# Patient Record
Sex: MALE | Race: WHITE | HISPANIC OR LATINO | Employment: FULL TIME | URBAN - METROPOLITAN AREA
[De-identification: names, ages, dates, MRNs, and addresses within clinical notes are randomized per-mention and may not be internally consistent; named-entity substitution may affect disease eponyms.]

---

## 2023-08-15 ENCOUNTER — APPOINTMENT (OUTPATIENT)
Dept: GENERAL RADIOLOGY | Facility: HOSPITAL | Age: 28
End: 2023-08-15

## 2023-08-15 ENCOUNTER — HOSPITAL ENCOUNTER (OUTPATIENT)
Facility: HOSPITAL | Age: 28
Discharge: HOME | End: 2023-08-15
Attending: EMERGENCY MEDICINE | Admitting: EMERGENCY MEDICINE
Payer: MEDICAID

## 2023-08-15 VITALS
BODY MASS INDEX: 36.45 KG/M2 | RESPIRATION RATE: 15 BRPM | HEART RATE: 89 BPM | DIASTOLIC BLOOD PRESSURE: 96 MMHG | SYSTOLIC BLOOD PRESSURE: 176 MMHG | TEMPERATURE: 97.9 F | OXYGEN SATURATION: 97 % | WEIGHT: 315 LBS | HEIGHT: 78 IN

## 2023-08-15 DIAGNOSIS — S92.522A CLOSED DISPLACED FRACTURE OF MIDDLE PHALANX OF LESSER TOE OF LEFT FOOT, INITIAL ENCOUNTER: Primary | ICD-10-CM

## 2023-08-15 DIAGNOSIS — S99.922A TOE INJURY, LEFT, INITIAL ENCOUNTER: ICD-10-CM

## 2023-08-15 PROCEDURE — 73660 X-RAY EXAM OF TOE(S): CPT

## 2023-08-15 PROCEDURE — G0463 HOSPITAL OUTPT CLINIC VISIT: HCPCS

## 2023-08-16 NOTE — DISCHARGE INSTRUCTIONS
Thank you for letting us care for you today.  You can take Tylenol and ibuprofen as needed for pain.  Rest, ice, elevate.  You can apply ice for 20 minutes at a time.  You can buddy tape to toes together to help with stability.  If your pain persist please follow-up with your primary care provider Dr. Tran for continued evaluation.  Return to the emergency room for any worsening pain, swelling or any other concerning symptoms.

## 2023-08-16 NOTE — FSED PROVIDER NOTE
[facility]    EMERGENCY DEPARTMENT ENCOUNTER    Room Number:  HALA/A  Date seen:  8/15/2023  Time seen: 20:01 EDT  PCP: Mirta Abraham MD  Historian: patient and wife    HPI:  Chief complaint:toe pain  Context:Yannick Khan is a 27 y.o. male who presents to the ED with c/o toe pain.  Patient reports that he tripped over his wife's shoe and injured his toe.  He reports that it is very painful and bruised since injury occurred.  He reports that the pain does not radiate anywhere.  He does report some intermittent tingling.  Patient reports he did take some Tylenol earlier.  Patient is nontoxic in appearance.    Timing: Constant   Duration: Today  Location: Left foot  Radiation: Nonradiating  Quality: Aching  Intensity/Severity: Mild  Associated Symptoms: Toe injury, toe pain  Aggravating Factors: Walking  Alleviating Factors: Tylenol  Treatment before arrival: Tylenol    The patient was placed in a mask in triage, hand hygiene was performed before and after my interaction with the patient.  I wore a mask and gloves during my entire interaction with the patient.    MEDICAL RECORD REVIEW      ALLERGIES  Patient has no known allergies.    PAST MEDICAL HISTORY  Active Ambulatory Problems     Diagnosis Date Noted    No Active Ambulatory Problems     Resolved Ambulatory Problems     Diagnosis Date Noted    No Resolved Ambulatory Problems     No Additional Past Medical History       PAST SURGICAL HISTORY  History reviewed. No pertinent surgical history.    FAMILY HISTORY  History reviewed. No pertinent family history.    SOCIAL HISTORY  Social History     Socioeconomic History    Marital status:        REVIEW OF SYSTEMS  Review of Systems    All systems reviewed and negative except for those discussed in HPI.     PHYSICAL EXAM    I have reviewed the triage vital signs and nursing notes.    ED Triage Vitals [08/15/23 1940]   Temp Heart Rate Resp BP SpO2   97.9 øF (36.6 øC) 89 15  176/96 97 %      Temp src Heart Rate Source Patient Position BP Location FiO2 (%)   -- -- -- -- --       Physical Exam  Constitutional:       Appearance: Normal appearance.   Cardiovascular:      Rate and Rhythm: Normal rate.      Pulses: Normal pulses.   Pulmonary:      Effort: Pulmonary effort is normal.   Musculoskeletal:      Left foot: Decreased range of motion. Normal capillary refill. Tenderness present. No swelling, deformity or bony tenderness. Normal pulse.        Legs:    Skin:     General: Skin is warm.      Capillary Refill: Capillary refill takes less than 2 seconds.   Neurological:      General: No focal deficit present.      Mental Status: He is alert.   Psychiatric:         Mood and Affect: Mood normal.         Behavior: Behavior normal.       Vital signs and nursing notes reviewed.        LAB RESULTS  No results found for this or any previous visit (from the past 24 hour(s)).    Ordered the above labs and independently reviewed the results.      RADIOLOGY RESULTS  XR Toe 2+ View Left    Result Date: 8/15/2023  XR TOE 2+ VW LEFT Date of Exam: 8/15/2023 7:50 PM EDT Indication: injury Comparison: None available. Findings: There is soft tissue swelling of the fourth digit. No soft tissue gas or radiopaque foreign body. The lateral view is limited owing to overlapping toes. On the AP view there is a minimally displaced intra-articular of the medial base of the fourth toe middle phalanx, not well appreciated on the oblique view. 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     Impression: Minimally displaced, intra-articular fracture involving the medial base of the fourth toe middle phalanx, only well seen on the AP view. Electronically Signed: Cali Vidales  8/15/2023 8:51 PM EDT  Workstation ID: [id number]        I ordered the above noted radiological studies. Independently reviewed by me and discussed with radiologist.  See dictation above for  official radiology interpretation.      Orders placed during this visit:  Orders Placed This Encounter   Procedures    XR Toe 2+ View Left    Jey tape (specify site)           PROCEDURES    Procedures        MEDICATIONS GIVEN IN ER    Medications - No data to display      PROGRESS, DATA ANALYSIS, CONSULTS, AND MEDICAL DECISION MAKING    All labs have been independently reviewed by me.  All radiology studies have been reviewed by me.   EKG's independently reviewed by me.  Discussion below represents my analysis of pertinent findings related to patient's condition, differential diagnosis, treatment plan and final disposition.    I rechecked the patient.  I discussed the patient's labs, radiology findings (including all incidental findings), diagnosis, and plan for discharge.  A repeat exam reveals no new worrisome changes from my initial exam findings.  The patient understands that the fact that they are being discharged does not denote that nothing is abnormal, it indicates that no clinical emergency is present and that they must follow-up as directed in order to properly maintain their health.  Follow-up instructions (specifically listed below) and return to ER precautions were given at this time.  I specifically instructed the patient to follow-up with their PCP.  The patient understands and agrees with the plan, and is ready for discharge.  All questions answered.    ED Course as of 08/15/23 2100   Tue Aug 15, 2023   2055 XR Toe 2+ View Left  Impression:  Minimally displaced, intra-articular fracture involving the medial base of the fourth toe middle phalanx, only well seen on the AP view.  Electronically Signed: Cali Vidales    8/15/2023 8:51 PM EDT  [KJ]      ED Course User Index  [KJ] Conchis Michele APRN       AS OF 21:00 EDT VITALS:    BP - 176/96  HR - 89  TEMP - 97.9 øF (36.6 øC)  02 SATS - 97%    Medical Decision Making  MEDICAL DECISION  Radiology interpretation:  Images reviewed and interpreted  radiology images , minimally displaced fracture fourth toe middle phalanx    This patient presents after an injury to the toe. Considered, but doubt, open fracture. Low index of suspicion for a dislocation or Lisfranc injury. Doubt other acute causes of toe pain at this time.  Patient was noted to have a minimally displaced fracture of his middle phalanx of the fourth toe.  Patient will have the toes ronaldo taped together for comfort.  We discussed using Tylenol and ibuprofen rest ice and elevate.  He is to follow-up with his primary care provider or Dr. Tran for continued evaluation.  I discussed the findings with the patient who is agreeable to the plan of care and denies any questions at this time.          Problems Addressed:  Closed displaced fracture of middle phalanx of lesser toe of left foot, initial encounter: complicated acute illness or injury  Toe injury, left, initial encounter: complicated acute illness or injury    Amount and/or Complexity of Data Reviewed  Radiology: ordered. Decision-making details documented in ED Course.          DIAGNOSIS  Final diagnoses:   Closed displaced fracture of middle phalanx of lesser toe of left foot, initial encounter   Toe injury, left, initial encounter       No orders of the defined types were placed in this encounter.          I performed hand hygiene on entry into the pt room and upon exit.     Dictated utilizing Dragon dictation     Note Disclaimer: At [facility], we believe that sharing information builds trust and better relationships. You are receiving this note because you recently visited [facility]. It is possible you will see health information before a provider has talked with you about it. This kind of information can be easy to misunderstand. To help you fully understand what it means for your health, we urge you to discuss this note with your provider.